# Patient Record
Sex: MALE | Race: WHITE
[De-identification: names, ages, dates, MRNs, and addresses within clinical notes are randomized per-mention and may not be internally consistent; named-entity substitution may affect disease eponyms.]

---

## 2018-03-02 ENCOUNTER — HOSPITAL ENCOUNTER (OUTPATIENT)
Dept: HOSPITAL 91 - GIL | Age: 48
Discharge: HOME | End: 2018-03-02
Payer: COMMERCIAL

## 2018-03-02 ENCOUNTER — HOSPITAL ENCOUNTER (OUTPATIENT)
Age: 48
Discharge: HOME | End: 2018-03-02

## 2018-03-02 DIAGNOSIS — K76.6: ICD-10-CM

## 2018-03-02 DIAGNOSIS — E11.9: ICD-10-CM

## 2018-03-02 DIAGNOSIS — K31.89: ICD-10-CM

## 2018-03-02 DIAGNOSIS — I85.00: Primary | ICD-10-CM

## 2018-03-02 PROCEDURE — 82962 GLUCOSE BLOOD TEST: CPT

## 2018-03-02 PROCEDURE — 43235 EGD DIAGNOSTIC BRUSH WASH: CPT

## 2019-02-18 ENCOUNTER — HOSPITAL ENCOUNTER (OUTPATIENT)
Dept: HOSPITAL 91 - GIL | Age: 49
Discharge: HOME | End: 2019-02-18
Payer: COMMERCIAL

## 2019-02-18 ENCOUNTER — HOSPITAL ENCOUNTER (OUTPATIENT)
Dept: HOSPITAL 10 - GIL | Age: 49
Discharge: HOME | End: 2019-02-18
Attending: INTERNAL MEDICINE
Payer: COMMERCIAL

## 2019-02-18 VITALS
HEIGHT: 64 IN | WEIGHT: 217.82 LBS | WEIGHT: 217.82 LBS | BODY MASS INDEX: 37.19 KG/M2 | HEIGHT: 64 IN | BODY MASS INDEX: 37.19 KG/M2

## 2019-02-18 VITALS — SYSTOLIC BLOOD PRESSURE: 135 MMHG | RESPIRATION RATE: 18 BRPM | DIASTOLIC BLOOD PRESSURE: 85 MMHG | HEART RATE: 69 BPM

## 2019-02-18 VITALS — HEART RATE: 76 BPM | SYSTOLIC BLOOD PRESSURE: 127 MMHG | RESPIRATION RATE: 18 BRPM | DIASTOLIC BLOOD PRESSURE: 81 MMHG

## 2019-02-18 DIAGNOSIS — I10: ICD-10-CM

## 2019-02-18 DIAGNOSIS — K64.8: ICD-10-CM

## 2019-02-18 DIAGNOSIS — Z12.11: Primary | ICD-10-CM

## 2019-02-18 DIAGNOSIS — K57.30: ICD-10-CM

## 2019-02-18 DIAGNOSIS — E11.9: ICD-10-CM

## 2019-02-18 PROCEDURE — 45378 DIAGNOSTIC COLONOSCOPY: CPT

## 2019-02-18 NOTE — PREAC
Date/Time of Note


Date/Time of Note


DATE: 2/18/19 


TIME: 12:49





Anesthesia Eval and Record


Evaluation


Time Pre-Procedure Interview


DATE: 2/18/19 


TIME: 12:49


Age


48


Sex


male


NPO:  8 hrs


Preoperative diagnosis


abd pain


Planned procedure


colonoscopy





Past Medical History


Past Medical History:  Includes


Cardio:  HTN


Endo:  Diabetes


Musculoskeletal:  Osteoarthritis


GI:  GERD


Heme:  Anemia





Surgery & Anesthesia Issues


No known issue





Meds


Anticoagulation:  No


Beta Blocker within 24 hr:  No


Reason Beta Blocker not given:  Pt. not on B-Blocker


Reported Medications


[Cirrhosis Med]   No Conflict Check


   3/2/18


Ursodiol* (Actigall*) 300 Mg Cap, 300 MG PO BID, #60 CAP


   3/2/18


Metformin* (Glucophage*) 500 Mg Tab, 500 MG PO WITH LUNCH DINNER, #60 TAB


   3/2/18


Levothyroxine Sodium* (Levothyroxine Sodium*) 100 Mcg Tablet, 100 MCG PO BEFORE 


BREAKFAST, #30 TAB


   3/2/18


Meds reviewed:  Yes





Allergies


Coded Allergies:  


     No Known Allergy (Unverified , 3/2/18)


Allergies Reviewed:  Yes





Labs/Studies


Labs Reviewed:  Reviewed by anesthesiologist


Pregnancy test:  Negative


Studies:  ECG





Pre-procedure Exam


Airway:  Adequate mouth opening, Adequate thyromental dist


Mallampati:  Mallampati II


Teeth:  Normal


Lung:  Normal


Heart:  Normal





ASA Physical Status


ASA physical status:  3


Emergency:  None





Planned Anesthetic


General/MAC:  Mask





Pre-operative Attestations


Prior to commencing anesthesia and surgery, the patient was re-evaluated, there 


was verification of:


*The patient's identity


*The results of appropriate recent lab work and preoperative vital signs


*The above evaluation not changing prior to induction


*Anesthetic plan, risk benefits, alternative and complications discussed with 


patient/family; questions answered; patient/family understands, accepts and 


wishes to proceed.











DANIEL HERNANDEZ               Feb 18, 2019 12:50

## 2019-02-18 NOTE — PAC
Date/Time of Note


Date/Time of Note


DATE: 2/18/19 


TIME: 14:29





Post-Anesthesia Notes


Post-Anesthesia Note


Last documented vital signs





Vital Signs


  Date      Temp  Pulse  Resp  B/P (MAP)   Pulse Ox  O2          O2 Flow    FiO2


Time                                                 Delivery    Rate


   2/18/19  98.0     76    18      127/81       100  Room Air


     13:40                           (96)





Activity:  WNL


Respiratory function:  WNL


Cardiovascular function:  WNL


Mental status:  Baseline


Pain reasonably controlled:  Yes


Hydration appropriate:  Yes


Nausea/Vomiting absent:  Yes











DANIEL HERNANDEZ               Feb 18, 2019 14:29